# Patient Record
Sex: MALE | Race: WHITE | Employment: FULL TIME | URBAN - METROPOLITAN AREA
[De-identification: names, ages, dates, MRNs, and addresses within clinical notes are randomized per-mention and may not be internally consistent; named-entity substitution may affect disease eponyms.]

---

## 2018-05-05 ENCOUNTER — HOSPITAL ENCOUNTER (EMERGENCY)
Age: 68
Discharge: HOME | End: 2018-05-05
Attending: EMERGENCY MEDICINE
Payer: COMMERCIAL

## 2018-05-05 VITALS
TEMPERATURE: 98.3 F | BODY MASS INDEX: 28.5 KG/M2 | RESPIRATION RATE: 18 BRPM | SYSTOLIC BLOOD PRESSURE: 166 MMHG | HEIGHT: 68 IN | DIASTOLIC BLOOD PRESSURE: 71 MMHG | WEIGHT: 188.05 LBS | OXYGEN SATURATION: 100 % | HEART RATE: 72 BPM

## 2018-05-05 DIAGNOSIS — R25.2 MUSCLE CRAMPS: Primary | ICD-10-CM

## 2018-05-05 PROCEDURE — 99282 EMERGENCY DEPT VISIT SF MDM: CPT

## 2018-05-05 RX ORDER — CYCLOBENZAPRINE HCL 10 MG
10 TABLET ORAL
Qty: 20 TAB | Refills: 0 | Status: SHIPPED | OUTPATIENT
Start: 2018-05-05

## 2018-05-05 RX ORDER — IBUPROFEN 600 MG/1
600 TABLET ORAL
Qty: 20 TAB | Refills: 0 | Status: SHIPPED | OUTPATIENT
Start: 2018-05-05

## 2018-05-05 RX ORDER — LISINOPRIL AND HYDROCHLOROTHIAZIDE 12.5; 2 MG/1; MG/1
TABLET ORAL DAILY
COMMUNITY

## 2018-05-05 RX ORDER — GUAIFENESIN 100 MG/5ML
81 LIQUID (ML) ORAL DAILY
COMMUNITY

## 2018-05-05 RX ORDER — ATORVASTATIN CALCIUM 20 MG/1
TABLET, FILM COATED ORAL DAILY
COMMUNITY

## 2018-05-05 NOTE — ED PROVIDER NOTES
EMERGENCY DEPARTMENT HISTORY AND PHYSICAL EXAM      Date: 5/5/2018  Patient Name: Venkatesh Tirado    History of Presenting Illness     Chief Complaint   Patient presents with    Leg Pain     \"i got a sharp shooting cramping pain in my right calf while walking at work that shot up into my thigh after\"       History Provided By: Patient    HPI: Venkatesh Tirado, 78 y.o. male with no significant PMHx, presents ambulatory to the ED with cc of constant posterior left leg pain which started today while walking at work. He states his pain is in his calf and quadriceps. Pt describes the pain as a cramp. His associated pain is moderate. He notes his pain is worse with walking. He has tried resting in his recliner at home with minimal relief. Pt reports no redness or swelling of his leg. He is otherwise without complaint. Chief Complaint: left leg pain  Duration: 1 day  Timing:  Constant  Location: posterior left leg  Quality: Cramping  Severity: Moderate  Modifying Factors: worse with walking  Associated Symptoms: denies any other associated signs or symptoms    There are no other complaints, changes, or physical findings at this time. PCP: PROVIDER UNKNOWN    Past History     Past Medical History:  Past Medical History:   Diagnosis Date    Hypertension        Past Surgical History:  History reviewed. No pertinent surgical history. Family History:  History reviewed. No pertinent family history. Social History:  Social History   Substance Use Topics    Smoking status: Never Smoker    Smokeless tobacco: Never Used    Alcohol use No       Allergies:  No Known Allergies      Review of Systems   Review of Systems   Constitutional: Negative for chills and fever. HENT: Negative for ear pain and sore throat. Eyes: Negative for pain, redness and visual disturbance. Respiratory: Negative for chest tightness, shortness of breath and wheezing. Cardiovascular: Negative for chest pain and palpitations. Gastrointestinal: Negative for abdominal pain, diarrhea, nausea, rectal pain and vomiting. Genitourinary: Negative for dysuria, frequency and urgency. Musculoskeletal: Positive for myalgias (left leg cramping). Negative for arthralgias, back pain and neck pain. Skin: Negative for rash and wound. Neurological: Negative for dizziness, weakness, numbness and headaches. Hematological: Negative for adenopathy. Psychiatric/Behavioral: Negative for dysphoric mood. The patient is not nervous/anxious. Physical Exam   Physical Exam   Constitutional: He is oriented to person, place, and time. He appears well-developed and well-nourished. Non-toxic appearance. No distress. HENT:   Head: Normocephalic and atraumatic. Head is without right periorbital erythema and without left periorbital erythema. Right Ear: External ear normal.   Left Ear: External ear normal.   Nose: Nose normal.   Mouth/Throat: Uvula is midline. No trismus in the jaw. Eyes: Conjunctivae and EOM are normal. Pupils are equal, round, and reactive to light. No scleral icterus. Neck: Normal range of motion and full passive range of motion without pain. Cardiovascular: Normal rate, regular rhythm and normal heart sounds. Pulmonary/Chest: Effort normal and breath sounds normal. No accessory muscle usage. No tachypnea. No respiratory distress. He has no decreased breath sounds. He has no wheezes. Abdominal: Soft. There is no tenderness. There is no rigidity and no guarding. Musculoskeletal: Normal range of motion. Good symmetry of lower extremities. No redness or swelling. No palpable muscle spasm of either lower extremity. No palpable varicosities. Full active ROM. Neurological: He is alert and oriented to person, place, and time. He is not disoriented. No cranial nerve deficit or sensory deficit. GCS eye subscore is 4. GCS verbal subscore is 5. GCS motor subscore is 6. Skin: Skin is intact. No rash noted. Psychiatric: He has a normal mood and affect. His speech is normal.   Nursing note and vitals reviewed. Medical Decision Making   I am the first provider for this patient. I reviewed the vital signs, available nursing notes, past medical history, past surgical history, family history and social history. Vital Signs-Reviewed the patient's vital signs. Patient Vitals for the past 12 hrs:   Temp Pulse Resp BP SpO2   05/05/18 1934 98.3 °F (36.8 °C) 72 18 166/71 100 %     Records Reviewed: Nursing Notes and Old Medical Records    Provider Notes (Medical Decision Making):   Presentation suggests muscle cramps. Basic testing offered. Pt declines. I believe this is safe. Will encourage hydration, rest, and medications with follow up if symptoms worsen. ED Course:   Initial assessment performed. The patients presenting problems have been discussed, and they are in agreement with the care plan formulated and outlined with them. I have encouraged them to ask questions as they arise throughout their visit. 8:27 PM  Patient notes he has recently had extensive blood work with no abnormal findings. He states he does not drink much water. Disposition:  DISCHARGE NOTE  8:48 PM  The patient has been re-evaluated and is ready for discharge. Reviewed available results with patient. Counseled patient on diagnosis and care plan. Patient has expressed understanding, and all questions have been answered. Patient agrees with plan and agrees to follow up as recommended, or return to the ED if their symptoms worsen. Discharge instructions have been provided and explained to the patient, along with reasons to return to the ED. PLAN:  1.    Discharge Medication List as of 5/5/2018  8:50 PM      START taking these medications    Details   ibuprofen (MOTRIN) 600 mg tablet Take 1 Tab by mouth every eight (8) hours as needed for Pain., Print, Disp-20 Tab, R-0      cyclobenzaprine (FLEXERIL) 10 mg tablet Take 1 Tab by mouth three (3) times daily as needed for Muscle Spasm(s). , Print, Disp-20 Tab, R-0         CONTINUE these medications which have NOT CHANGED    Details   aspirin 81 mg chewable tablet Take 81 mg by mouth daily. , Historical Med      atorvastatin (LIPITOR) 20 mg tablet Take  by mouth daily. , Historical Med      lisinopril-hydroCHLOROthiazide (PRINZIDE, ZESTORETIC) 20-12.5 mg per tablet Take  by mouth daily. , Historical Med           2. Follow-up Information     Follow up With Details Comments Contact Info    [facility] Schedule an appointment as soon as possible for a visit PRIMARY CARE: call to schedule follow up [address]  [address] [facility]  [phone]        Return to ED if worse     Diagnosis     Clinical Impression:   1. Muscle cramps        Attestations:    Attestation Note:  This note is prepared by RICKY [facility], acting as Scribe for [employer] PA-C Griffin Olszewski: The scribe's documentation has been prepared under my direction and personally reviewed by me in its entirety. I confirm that the note above accurately reflects all work, treatment, procedures, and medical decision making performed by me.

## 2018-05-06 NOTE — ED NOTES
MICHELLE Juárez has reviewed discharge instructions with the patient. The patient verbalized understanding. Pt ambulatory home, discharge papers in hand.

## 2018-05-06 NOTE — ED TRIAGE NOTES
Pt arrived ambulatory from triage to room 6 with cc right leg pain. Per pt he was walking at work when he felt a cramp in his right calf, pt states the pain continued to get worse with ambulation and was shooting up to his thigh. Pt denies any long trips recently. Leg is not hot to tough, no redness or swelling noted. Pt in no acute distress. VSS.

## 2024-04-29 ENCOUNTER — HOSPITAL ENCOUNTER (OUTPATIENT)
Facility: HOSPITAL | Age: 74
Discharge: HOME | End: 2024-05-01
Payer: MEDICARE

## 2024-04-29 DIAGNOSIS — Z86.79 HISTORY OF CAROTID STENOSIS: ICD-10-CM

## 2024-04-29 PROCEDURE — 93880 EXTRACRANIAL BILAT STUDY: CPT

## 2024-05-01 LAB
VAS LEFT CCA DIST EDV: 25.7 CM/S
VAS LEFT CCA DIST PSV: 103.2 CM/S
VAS LEFT CCA PROX EDV: 20.8 CM/S
VAS LEFT CCA PROX PSV: 98.4 CM/S
VAS LEFT ECA EDV: 9.06 CM/S
VAS LEFT ECA PSV: 136.9 CM/S
VAS LEFT ICA DIST EDV: 33.8 CM/S
VAS LEFT ICA DIST PSV: 90.3 CM/S
VAS LEFT ICA MID EDV: 28.9 CM/S
VAS LEFT ICA MID PSV: 91.9 CM/S
VAS LEFT ICA PROX EDV: 14.4 CM/S
VAS LEFT ICA PROX PSV: 74.1 CM/S
VAS LEFT ICA/CCA PSV: 0.89 NO UNITS
VAS LEFT SUBCLAVIAN PROX EDV: 0 CM/S
VAS LEFT SUBCLAVIAN PROX PSV: 139.2 CM/S
VAS LEFT VERTEBRAL EDV: 5.71 CM/S
VAS LEFT VERTEBRAL PSV: 35.4 CM/S
VAS RIGHT CCA DIST EDV: 23.5 CM/S
VAS RIGHT CCA DIST PSV: 104.3 CM/S
VAS RIGHT CCA PROX EDV: 21.5 CM/S
VAS RIGHT CCA PROX PSV: 106.2 CM/S
VAS RIGHT ECA EDV: 8.36 CM/S
VAS RIGHT ECA PSV: 159.3 CM/S
VAS RIGHT ICA DIST EDV: 34.6 CM/S
VAS RIGHT ICA DIST PSV: 129.8 CM/S
VAS RIGHT ICA MID EDV: 14.9 CM/S
VAS RIGHT ICA MID PSV: 71.4 CM/S
VAS RIGHT ICA PROX EDV: 16.2 CM/S
VAS RIGHT ICA PROX PSV: 77.3 CM/S
VAS RIGHT ICA/CCA PSV: 1.3 NO UNITS
VAS RIGHT SUBCLAVIAN PROX EDV: 0 CM/S
VAS RIGHT SUBCLAVIAN PROX PSV: 116.1 CM/S
VAS RIGHT VERTEBRAL EDV: 14.11 CM/S
VAS RIGHT VERTEBRAL PSV: 58.1 CM/S

## 2025-02-13 ENCOUNTER — APPOINTMENT (OUTPATIENT)
Facility: HOSPITAL | Age: 75
DRG: 176 | End: 2025-02-13
Payer: MEDICARE

## 2025-02-13 ENCOUNTER — HOSPITAL ENCOUNTER (INPATIENT)
Facility: HOSPITAL | Age: 75
LOS: 3 days | Discharge: HOME | DRG: 176 | End: 2025-02-16
Attending: EMERGENCY MEDICINE | Admitting: INTERNAL MEDICINE
Payer: MEDICARE

## 2025-02-13 ENCOUNTER — APPOINTMENT (OUTPATIENT)
Facility: HOSPITAL | Age: 75
DRG: 176 | End: 2025-02-13
Attending: EMERGENCY MEDICINE
Payer: MEDICARE

## 2025-02-13 DIAGNOSIS — K21.9 GASTROESOPHAGEAL REFLUX DISEASE, UNSPECIFIED WHETHER ESOPHAGITIS PRESENT: ICD-10-CM

## 2025-02-13 DIAGNOSIS — I26.93 SINGLE SUBSEGMENTAL PULMONARY EMBOLISM WITHOUT ACUTE COR PULMONALE (HCC): ICD-10-CM

## 2025-02-13 DIAGNOSIS — R73.9 HYPERGLYCEMIA: ICD-10-CM

## 2025-02-13 DIAGNOSIS — E87.1 HYPONATREMIA: ICD-10-CM

## 2025-02-13 DIAGNOSIS — R21 RASH AND OTHER NONSPECIFIC SKIN ERUPTION: Primary | ICD-10-CM

## 2025-02-13 DIAGNOSIS — I26.99 ACUTE PULMONARY EMBOLISM WITHOUT ACUTE COR PULMONALE, UNSPECIFIED PULMONARY EMBOLISM TYPE (HCC): ICD-10-CM

## 2025-02-13 LAB
ALBUMIN SERPL-MCNC: 3.1 G/DL (ref 3.5–5)
ALBUMIN/GLOB SERPL: 0.9 (ref 1.1–2.2)
ALP SERPL-CCNC: 69 U/L (ref 45–117)
ALT SERPL-CCNC: 16 U/L (ref 12–78)
ANION GAP SERPL CALC-SCNC: 5 MMOL/L (ref 2–12)
AST SERPL-CCNC: 12 U/L (ref 15–37)
BASOPHILS # BLD: 0.01 K/UL (ref 0–0.1)
BASOPHILS NFR BLD: 0.1 % (ref 0–1)
BILIRUB SERPL-MCNC: 1 MG/DL (ref 0.2–1)
BUN SERPL-MCNC: 26 MG/DL (ref 6–20)
BUN/CREAT SERPL: 19 (ref 12–20)
CALCIUM SERPL-MCNC: 8.4 MG/DL (ref 8.5–10.1)
CHLORIDE SERPL-SCNC: 99 MMOL/L (ref 97–108)
CK SERPL-CCNC: 66 U/L (ref 39–308)
CO2 SERPL-SCNC: 28 MMOL/L (ref 21–32)
CREAT SERPL-MCNC: 1.36 MG/DL (ref 0.7–1.3)
CRP SERPL-MCNC: 8.8 MG/DL (ref 0–0.3)
DIFFERENTIAL METHOD BLD: ABNORMAL
ECHO BSA: 2.12 M2
ECHO EST RA PRESSURE: 10 MMHG
ECHO LA DIAMETER INDEX: 1.15 CM/M2
ECHO LA DIAMETER: 2.4 CM
ECHO LV EF PHYSICIAN: 55 %
ECHO LV FRACTIONAL SHORTENING: 47 % (ref 28–44)
ECHO LV INTERNAL DIMENSION DIASTOLE INDEX: 2.25 CM/M2
ECHO LV INTERNAL DIMENSION DIASTOLIC: 4.7 CM (ref 4.2–5.9)
ECHO LV INTERNAL DIMENSION SYSTOLIC INDEX: 1.2 CM/M2
ECHO LV INTERNAL DIMENSION SYSTOLIC: 2.5 CM
ECHO LV IVSD: 0.8 CM (ref 0.6–1)
ECHO LV MASS 2D: 153.4 G (ref 88–224)
ECHO LV MASS INDEX 2D: 73.4 G/M2 (ref 49–115)
ECHO LV POSTERIOR WALL DIASTOLIC: 1.1 CM (ref 0.6–1)
ECHO LV RELATIVE WALL THICKNESS RATIO: 0.47
ECHO RIGHT VENTRICULAR SYSTOLIC PRESSURE (RVSP): 30 MMHG
ECHO TV REGURGITANT MAX VELOCITY: 2.22 M/S
ECHO TV REGURGITANT PEAK GRADIENT: 20 MMHG
EOSINOPHIL # BLD: 0.01 K/UL (ref 0–0.4)
EOSINOPHIL NFR BLD: 0.1 % (ref 0–7)
ERYTHROCYTE [DISTWIDTH] IN BLOOD BY AUTOMATED COUNT: 12 % (ref 11.5–14.5)
ERYTHROCYTE [SEDIMENTATION RATE] IN BLOOD: 4 MM/HR (ref 0–20)
GLOBULIN SER CALC-MCNC: 3.3 G/DL (ref 2–4)
GLUCOSE SERPL-MCNC: 230 MG/DL (ref 65–100)
HCT VFR BLD AUTO: 46 % (ref 36.6–50.3)
HGB BLD-MCNC: 15.8 G/DL (ref 12.1–17)
IMM GRANULOCYTES # BLD AUTO: 0.03 K/UL (ref 0–0.04)
IMM GRANULOCYTES NFR BLD AUTO: 0.4 % (ref 0–0.5)
INR PPP: 1.1 (ref 0.9–1.1)
LACTATE BLD-SCNC: 1.9 MMOL/L (ref 0.4–2)
LYMPHOCYTES # BLD: 0.72 K/UL (ref 0.8–3.5)
LYMPHOCYTES NFR BLD: 10.1 % (ref 12–49)
MCH RBC QN AUTO: 32 PG (ref 26–34)
MCHC RBC AUTO-ENTMCNC: 34.3 G/DL (ref 30–36.5)
MCV RBC AUTO: 93.1 FL (ref 80–99)
MONOCYTES # BLD: 0.34 K/UL (ref 0–1)
MONOCYTES NFR BLD: 4.8 % (ref 5–13)
NEUTS SEG # BLD: 5.99 K/UL (ref 1.8–8)
NEUTS SEG NFR BLD: 84.5 % (ref 32–75)
NRBC # BLD: 0 K/UL (ref 0–0.01)
NRBC BLD-RTO: 0 PER 100 WBC
NT PRO BNP: 66 PG/ML
PLATELET # BLD AUTO: 180 K/UL (ref 150–400)
PMV BLD AUTO: 9 FL (ref 8.9–12.9)
POTASSIUM SERPL-SCNC: 4.6 MMOL/L (ref 3.5–5.1)
PROT SERPL-MCNC: 6.4 G/DL (ref 6.4–8.2)
PROTHROMBIN TIME: 11.9 SEC (ref 9.2–11.2)
RBC # BLD AUTO: 4.94 M/UL (ref 4.1–5.7)
RBC MORPH BLD: ABNORMAL
SODIUM SERPL-SCNC: 132 MMOL/L (ref 136–145)
TROPONIN I SERPL HS-MCNC: 11 NG/L (ref 0–76)
TROPONIN I SERPL HS-MCNC: 13 NG/L (ref 0–76)
WBC # BLD AUTO: 7.1 K/UL (ref 4.1–11.1)
WBC MORPH BLD: ABNORMAL

## 2025-02-13 PROCEDURE — 71275 CT ANGIOGRAPHY CHEST: CPT

## 2025-02-13 PROCEDURE — 6360000004 HC RX CONTRAST MEDICATION: Performed by: EMERGENCY MEDICINE

## 2025-02-13 PROCEDURE — 85652 RBC SED RATE AUTOMATED: CPT

## 2025-02-13 PROCEDURE — 86140 C-REACTIVE PROTEIN: CPT

## 2025-02-13 PROCEDURE — 2580000003 HC RX 258: Performed by: INTERNAL MEDICINE

## 2025-02-13 PROCEDURE — 36415 COLL VENOUS BLD VENIPUNCTURE: CPT

## 2025-02-13 PROCEDURE — 93005 ELECTROCARDIOGRAM TRACING: CPT | Performed by: INTERNAL MEDICINE

## 2025-02-13 PROCEDURE — 85610 PROTHROMBIN TIME: CPT

## 2025-02-13 PROCEDURE — 86037 ANCA TITER EACH ANTIBODY: CPT

## 2025-02-13 PROCEDURE — 86225 DNA ANTIBODY NATIVE: CPT

## 2025-02-13 PROCEDURE — 6370000000 HC RX 637 (ALT 250 FOR IP): Performed by: EMERGENCY MEDICINE

## 2025-02-13 PROCEDURE — 99285 EMERGENCY DEPT VISIT HI MDM: CPT

## 2025-02-13 PROCEDURE — 93005 ELECTROCARDIOGRAM TRACING: CPT | Performed by: EMERGENCY MEDICINE

## 2025-02-13 PROCEDURE — 86160 COMPLEMENT ANTIGEN: CPT

## 2025-02-13 PROCEDURE — 83605 ASSAY OF LACTIC ACID: CPT

## 2025-02-13 PROCEDURE — 86162 COMPLEMENT TOTAL (CH50): CPT

## 2025-02-13 PROCEDURE — 6360000002 HC RX W HCPCS: Performed by: INTERNAL MEDICINE

## 2025-02-13 PROCEDURE — 82550 ASSAY OF CK (CPK): CPT

## 2025-02-13 PROCEDURE — 71045 X-RAY EXAM CHEST 1 VIEW: CPT

## 2025-02-13 PROCEDURE — 6360000002 HC RX W HCPCS: Performed by: EMERGENCY MEDICINE

## 2025-02-13 PROCEDURE — 84484 ASSAY OF TROPONIN QUANT: CPT

## 2025-02-13 PROCEDURE — 86235 NUCLEAR ANTIGEN ANTIBODY: CPT

## 2025-02-13 PROCEDURE — 6370000000 HC RX 637 (ALT 250 FOR IP): Performed by: INTERNAL MEDICINE

## 2025-02-13 PROCEDURE — 83516 IMMUNOASSAY NONANTIBODY: CPT

## 2025-02-13 PROCEDURE — 85025 COMPLETE CBC W/AUTO DIFF WBC: CPT

## 2025-02-13 PROCEDURE — 6370000000 HC RX 637 (ALT 250 FOR IP): Performed by: NURSE PRACTITIONER

## 2025-02-13 PROCEDURE — 1100000003 HC PRIVATE W/ TELEMETRY

## 2025-02-13 PROCEDURE — 93308 TTE F-UP OR LMTD: CPT

## 2025-02-13 PROCEDURE — 2500000003 HC RX 250 WO HCPCS: Performed by: INTERNAL MEDICINE

## 2025-02-13 PROCEDURE — 80053 COMPREHEN METABOLIC PANEL: CPT

## 2025-02-13 PROCEDURE — 83880 ASSAY OF NATRIURETIC PEPTIDE: CPT

## 2025-02-13 RX ORDER — ENOXAPARIN SODIUM 100 MG/ML
1 INJECTION SUBCUTANEOUS EVERY 12 HOURS
Status: DISCONTINUED | OUTPATIENT
Start: 2025-02-13 | End: 2025-02-16 | Stop reason: HOSPADM

## 2025-02-13 RX ORDER — ENOXAPARIN SODIUM 100 MG/ML
1 INJECTION SUBCUTANEOUS ONCE
Status: COMPLETED | OUTPATIENT
Start: 2025-02-13 | End: 2025-02-13

## 2025-02-13 RX ORDER — ACETAMINOPHEN 325 MG/1
650 TABLET ORAL EVERY 6 HOURS PRN
Status: DISCONTINUED | OUTPATIENT
Start: 2025-02-13 | End: 2025-02-16 | Stop reason: HOSPADM

## 2025-02-13 RX ORDER — ONDANSETRON 2 MG/ML
4 INJECTION INTRAMUSCULAR; INTRAVENOUS EVERY 6 HOURS PRN
Status: DISCONTINUED | OUTPATIENT
Start: 2025-02-13 | End: 2025-02-16 | Stop reason: HOSPADM

## 2025-02-13 RX ORDER — LISINOPRIL 2.5 MG/1
TABLET ORAL DAILY
COMMUNITY

## 2025-02-13 RX ORDER — SODIUM CHLORIDE 0.9 % (FLUSH) 0.9 %
5-40 SYRINGE (ML) INJECTION PRN
Status: DISCONTINUED | OUTPATIENT
Start: 2025-02-13 | End: 2025-02-16 | Stop reason: HOSPADM

## 2025-02-13 RX ORDER — POTASSIUM CHLORIDE 1500 MG/1
40 TABLET, EXTENDED RELEASE ORAL PRN
Status: DISCONTINUED | OUTPATIENT
Start: 2025-02-13 | End: 2025-02-16 | Stop reason: HOSPADM

## 2025-02-13 RX ORDER — SODIUM CHLORIDE 9 MG/ML
INJECTION, SOLUTION INTRAVENOUS PRN
Status: DISCONTINUED | OUTPATIENT
Start: 2025-02-13 | End: 2025-02-16 | Stop reason: HOSPADM

## 2025-02-13 RX ORDER — ASPIRIN 81 MG/1
81 TABLET, CHEWABLE ORAL DAILY
COMMUNITY

## 2025-02-13 RX ORDER — ACETAMINOPHEN 650 MG/1
650 SUPPOSITORY RECTAL EVERY 6 HOURS PRN
Status: DISCONTINUED | OUTPATIENT
Start: 2025-02-13 | End: 2025-02-16 | Stop reason: HOSPADM

## 2025-02-13 RX ORDER — MAGNESIUM SULFATE IN WATER 40 MG/ML
2000 INJECTION, SOLUTION INTRAVENOUS PRN
Status: DISCONTINUED | OUTPATIENT
Start: 2025-02-13 | End: 2025-02-16 | Stop reason: HOSPADM

## 2025-02-13 RX ORDER — DIPHENHYDRAMINE HYDROCHLORIDE 50 MG/ML
25 INJECTION INTRAMUSCULAR; INTRAVENOUS EVERY 6 HOURS PRN
Status: DISCONTINUED | OUTPATIENT
Start: 2025-02-13 | End: 2025-02-16 | Stop reason: HOSPADM

## 2025-02-13 RX ORDER — FAMOTIDINE 20 MG/1
20 TABLET, FILM COATED ORAL 2 TIMES DAILY PRN
Status: DISCONTINUED | OUTPATIENT
Start: 2025-02-13 | End: 2025-02-16 | Stop reason: HOSPADM

## 2025-02-13 RX ORDER — ATORVASTATIN CALCIUM 10 MG/1
10 TABLET, FILM COATED ORAL NIGHTLY
Status: DISCONTINUED | OUTPATIENT
Start: 2025-02-13 | End: 2025-02-16 | Stop reason: HOSPADM

## 2025-02-13 RX ORDER — POLYETHYLENE GLYCOL 3350 17 G/17G
17 POWDER, FOR SOLUTION ORAL DAILY PRN
Status: DISCONTINUED | OUTPATIENT
Start: 2025-02-13 | End: 2025-02-16 | Stop reason: HOSPADM

## 2025-02-13 RX ORDER — SODIUM CHLORIDE 9 MG/ML
INJECTION, SOLUTION INTRAVENOUS CONTINUOUS
Status: DISCONTINUED | OUTPATIENT
Start: 2025-02-13 | End: 2025-02-16 | Stop reason: HOSPADM

## 2025-02-13 RX ORDER — IOPAMIDOL 755 MG/ML
100 INJECTION, SOLUTION INTRAVASCULAR
Status: COMPLETED | OUTPATIENT
Start: 2025-02-13 | End: 2025-02-13

## 2025-02-13 RX ORDER — ONDANSETRON 4 MG/1
4 TABLET, ORALLY DISINTEGRATING ORAL EVERY 8 HOURS PRN
Status: DISCONTINUED | OUTPATIENT
Start: 2025-02-13 | End: 2025-02-16 | Stop reason: HOSPADM

## 2025-02-13 RX ORDER — ACETAMINOPHEN 325 MG/1
650 TABLET ORAL EVERY 4 HOURS PRN
Status: DISCONTINUED | OUTPATIENT
Start: 2025-02-13 | End: 2025-02-16 | Stop reason: HOSPADM

## 2025-02-13 RX ORDER — SIMVASTATIN 5 MG
TABLET ORAL NIGHTLY
COMMUNITY

## 2025-02-13 RX ORDER — POTASSIUM CHLORIDE 7.45 MG/ML
10 INJECTION INTRAVENOUS PRN
Status: DISCONTINUED | OUTPATIENT
Start: 2025-02-13 | End: 2025-02-16 | Stop reason: HOSPADM

## 2025-02-13 RX ORDER — ASPIRIN 81 MG/1
81 TABLET, CHEWABLE ORAL DAILY
Status: DISCONTINUED | OUTPATIENT
Start: 2025-02-13 | End: 2025-02-16 | Stop reason: HOSPADM

## 2025-02-13 RX ORDER — DIPHENHYDRAMINE HCL 25 MG
25 TABLET ORAL EVERY 6 HOURS PRN
Qty: 20 TABLET | Refills: 0 | Status: SHIPPED | OUTPATIENT
Start: 2025-02-13 | End: 2025-02-23

## 2025-02-13 RX ORDER — SODIUM CHLORIDE 0.9 % (FLUSH) 0.9 %
5-40 SYRINGE (ML) INJECTION EVERY 12 HOURS SCHEDULED
Status: DISCONTINUED | OUTPATIENT
Start: 2025-02-13 | End: 2025-02-16 | Stop reason: HOSPADM

## 2025-02-13 RX ADMIN — SODIUM CHLORIDE, PRESERVATIVE FREE 10 ML: 5 INJECTION INTRAVENOUS at 21:33

## 2025-02-13 RX ADMIN — FAMOTIDINE 20 MG: 20 TABLET, FILM COATED ORAL at 21:12

## 2025-02-13 RX ADMIN — ENOXAPARIN SODIUM 90 MG: 100 INJECTION SUBCUTANEOUS at 14:29

## 2025-02-13 RX ADMIN — LIDOCAINE HYDROCHLORIDE 40 ML: 20 SOLUTION ORAL at 10:50

## 2025-02-13 RX ADMIN — SODIUM CHLORIDE: 9 INJECTION, SOLUTION INTRAVENOUS at 16:19

## 2025-02-13 RX ADMIN — ATORVASTATIN CALCIUM 10 MG: 10 TABLET, FILM COATED ORAL at 21:05

## 2025-02-13 RX ADMIN — DIPHENHYDRAMINE HYDROCHLORIDE 25 MG: 50 INJECTION INTRAMUSCULAR; INTRAVENOUS at 21:05

## 2025-02-13 RX ADMIN — ASPIRIN 81 MG: 81 TABLET, CHEWABLE ORAL at 16:17

## 2025-02-13 RX ADMIN — IOPAMIDOL 60 ML: 755 INJECTION, SOLUTION INTRAVENOUS at 13:12

## 2025-02-13 RX ADMIN — ENOXAPARIN SODIUM 90 MG: 100 INJECTION SUBCUTANEOUS at 21:01

## 2025-02-13 ASSESSMENT — LIFESTYLE VARIABLES
HOW OFTEN DO YOU HAVE A DRINK CONTAINING ALCOHOL: NEVER
HOW MANY STANDARD DRINKS CONTAINING ALCOHOL DO YOU HAVE ON A TYPICAL DAY: PATIENT DOES NOT DRINK

## 2025-02-13 ASSESSMENT — PAIN SCALES - GENERAL
PAINLEVEL_OUTOF10: 0
PAINLEVEL_OUTOF10: 0

## 2025-02-13 ASSESSMENT — HEART SCORE: ECG: NON-SPECIFC REPOLARIZATION DISTURBANCE/LBTB/PM

## 2025-02-13 NOTE — H&P
Hospitalist Admission Note    NAME:   Buck Perdomo   : 1950   MRN: [id number]     Date/Time: 2025 2:47 PM    Patient PCP: Moreno Brennan MD    ______________________________________________________________________  Given the patient's current clinical presentation, I have a high level of concern for decompensation if discharged from the emergency department.  Complex decision making was performed, which includes reviewing the patient's available past medical records, laboratory results, and x-ray films.       My assessment of this patient's clinical condition and my plan of care is as follows.    Assessment / Plan:      Pulmonary embolism  Syncope due to above    - CT chest showed left lingular pulmonary embolism  -Currently on room air, no right heart strain on CT  -Admit to medical floor  -Received Lovenox, continue  -Echo pending  Heme-onc eval    Urticaria, persistent for past 3 to 5 days  Rule out vasculitis  Consult general surgery for skin biopsy  Check complements, ANCA, ESR,CRP,MELANI  Check UA      H/O TIA  Carotid artery stenosis, s/p ?CEA  -Continue aspirin, statin          Medical Decision Making:   I personally reviewed labs: CBC/BMP  I personally reviewed imaging: CTA chest  I personally reviewed EKG: NSR  Toxic drug monitoring:   Discussed case with: ED provider. After discussion I am in agreement that acuity of patient's medical condition necessitates hospital stay.      Code Status: Full code  DVT Prophylaxis:   Baseline:     Subjective:   CHIEF COMPLAINT: Chest pain    HISTORY OF PRESENT ILLNESS:     Buck Perdomo is a 74 y.o.  male with PMHx significant for TIA, carotid artery stenosis, with possible carotid artery endarterectomy, who presented to ED with a chief complaint of indigestion, rash which has been going on for past 6 days, he reports that he has been having indigestion, chest pain for few days, but he started noticing rash in axillary area and around thigh initially

## 2025-02-13 NOTE — DISCHARGE INSTRUCTIONS
Results for orders placed or performed during the hospital encounter of 02/13/25   CBC with Auto Differential   Result Value Ref Range    WBC 7.1 4.1 - 11.1 K/uL    RBC 4.94 4.10 - 5.70 M/uL    Hemoglobin 15.8 12.1 - 17.0 g/dL    Hematocrit 46.0 36.6 - 50.3 %    MCV 93.1 80.0 - 99.0 FL    MCH 32.0 26.0 - 34.0 PG    MCHC 34.3 30.0 - 36.5 g/dL    RDW 12.0 11.5 - 14.5 %    Platelets 180 150 - 400 K/uL    MPV 9.0 8.9 - 12.9 FL    Nucleated RBCs 0.0 0  WBC    nRBC 0.00 0.00 - 0.01 K/uL    Neutrophils % 84.5 (H) 32.0 - 75.0 %    Lymphocytes % 10.1 (L) 12.0 - 49.0 %    Monocytes % 4.8 (L) 5.0 - 13.0 %    Eosinophils % 0.1 0.0 - 7.0 %    Basophils % 0.1 0.0 - 1.0 %    Immature Granulocytes % 0.4 0.0 - 0.5 %    Neutrophils Absolute 5.99 1.80 - 8.00 K/UL    Lymphocytes Absolute 0.72 (L) 0.80 - 3.50 K/UL    Monocytes Absolute 0.34 0.00 - 1.00 K/UL    Eosinophils Absolute 0.01 0.00 - 0.40 K/UL    Basophils Absolute 0.01 0.00 - 0.10 K/UL    Immature Granulocytes Absolute 0.03 0.00 - 0.04 K/UL    Differential Type SMEAR SCANNED      RBC Comment NORMOCYTIC, NORMOCHROMIC      WBC Comment REACTIVE LYMPHS     Comprehensive Metabolic Panel   Result Value Ref Range    Sodium 132 (L) 136 - 145 mmol/L    Potassium 4.6 3.5 - 5.1 mmol/L    Chloride 99 97 - 108 mmol/L    CO2 28 21 - 32 mmol/L    Anion Gap 5 2 - 12 mmol/L    Glucose 230 (H) 65 - 100 mg/dL    BUN 26 (H) 6 - 20 MG/DL    Creatinine 1.36 (H) 0.70 - 1.30 MG/DL    BUN/Creatinine Ratio 19 12 - 20      Est, Glom Filt Rate 55 (L) >60 ml/min/1.73m2    Calcium 8.4 (L) 8.5 - 10.1 MG/DL    Total Bilirubin 1.0 0.2 - 1.0 MG/DL    ALT 16 12 - 78 U/L    AST 12 (L) 15 - 37 U/L    Alk Phosphatase 69 45 - 117 U/L    Total Protein 6.4 6.4 - 8.2 g/dL    Albumin 3.1 (L) 3.5 - 5.0 g/dL    Globulin 3.3 2.0 - 4.0 g/dL    Albumin/Globulin Ratio 0.9 (L) 1.1 - 2.2     Troponin   Result Value Ref Range    Troponin, High Sensitivity 13 0 - 76 ng/L   EKG 12 Lead   Result Value Ref Range

## 2025-02-13 NOTE — ED PROVIDER NOTES
[facility] EMERGENCY DEPARTMENT  EMERGENCY DEPARTMENT ENCOUNTER    Patient Name: Buck Perdomo  MRN: [id number]  YOB: 1950  Provider: Yemi Cortés MD  PCP: Moreno Brennan MD  Time/Date of evaluation: 9:32 AM EST on 2/13/25    History of Presenting Illness     Chief Complaint   Patient presents with    Referral - General     Sent by PCP office for indigestion, rash and swelling to bilateral upper arm and hands x several days     History Provided by: Patient   History is limited by: Nothing    HISTORY (Narrative):   Buck Perdomo is a 74 y.o. male with a PMHX of hypertension and hyperlipidemia  who presents to the emergency department (room 17) by POV C/O multiple complaints.  Patient complains of indigestion that started yesterday as well as facial swelling and a diffuse rash for the past 5 days.  Patient walked into his PCPs office today and was referred to the emergency department for further evaluation.  He denies any new exposures or sick contacts.  He tells me he has never had indigestion in the past.  He denies any shortness of breath, diaphoresis, nausea, vomiting, or diarrhea.    Nursing Notes were all reviewed and agreed with or any disagreements were addressed in the HPI.    Past History     PAST MEDICAL HISTORY:  History reviewed. No pertinent past medical history.    PAST SURGICAL HISTORY:  History reviewed. No pertinent surgical history.    FAMILY HISTORY:  History reviewed. No pertinent family history.    SOCIAL HISTORY:       MEDICATIONS:  No current facility-administered medications on file prior to encounter.     Current Outpatient Medications on File Prior to Encounter   Medication Sig Dispense Refill    lisinopril (PRINIVIL;ZESTRIL) 2.5 MG tablet Take by mouth daily      simvastatin (ZOCOR) 5 MG tablet Take by mouth nightly      aspirin 81 MG chewable tablet Take 1 tablet by mouth daily         ALLERGIES:  No Known Allergies    SOCIAL DETERMINANTS OF HEALTH:  Social

## 2025-02-13 NOTE — ED NOTES
Patient rang out.  Upon entering room found patient with gaze deviating to the R, non responding, patient diaphoretic, raised rash scattered throughout, hypotensive and bradycardia.  Episode lasted about 1 minute and patient immediately became responsive again and alert and oriented x4.  Patient states that he felt like he had indigestion prior to the event but was feeling better.  Dr. Cortés at bedside to evaluate patient.  Patient placed on zoll monitor at this time.

## 2025-02-13 NOTE — ED NOTES
Patient ambulated to bathroom with staff.  Staff did not collect urine sample.  Patient aware of need for urine.  Will obtain next time patient uses the bathroom.

## 2025-02-14 ENCOUNTER — APPOINTMENT (OUTPATIENT)
Facility: HOSPITAL | Age: 75
DRG: 176 | End: 2025-02-14
Payer: MEDICARE

## 2025-02-14 LAB
ANION GAP SERPL CALC-SCNC: 7 MMOL/L (ref 2–12)
APPEARANCE UR: CLEAR
BACTERIA URNS QL MICRO: NEGATIVE /HPF
BILIRUB UR QL: NEGATIVE
BUN SERPL-MCNC: 24 MG/DL (ref 6–20)
BUN/CREAT SERPL: 21 (ref 12–20)
CALCIUM SERPL-MCNC: 8 MG/DL (ref 8.5–10.1)
CHLORIDE SERPL-SCNC: 102 MMOL/L (ref 97–108)
CO2 SERPL-SCNC: 24 MMOL/L (ref 21–32)
COLOR UR: ABNORMAL
CREAT SERPL-MCNC: 1.13 MG/DL (ref 0.7–1.3)
ECHO BSA: 2.12 M2
EKG ATRIAL RATE: 91 BPM
EKG DIAGNOSIS: NORMAL
EKG P AXIS: 64 DEGREES
EKG P-R INTERVAL: 156 MS
EKG Q-T INTERVAL: 346 MS
EKG QRS DURATION: 82 MS
EKG QTC CALCULATION (BAZETT): 425 MS
EKG R AXIS: -17 DEGREES
EKG T AXIS: 54 DEGREES
EKG VENTRICULAR RATE: 91 BPM
EPITH CASTS URNS QL MICRO: ABNORMAL /LPF
ERYTHROCYTE [DISTWIDTH] IN BLOOD BY AUTOMATED COUNT: 11.8 % (ref 11.5–14.5)
GLUCOSE SERPL-MCNC: 224 MG/DL (ref 65–100)
GLUCOSE UR STRIP.AUTO-MCNC: 500 MG/DL
HCT VFR BLD AUTO: 44.6 % (ref 36.6–50.3)
HGB BLD-MCNC: 15.1 G/DL (ref 12.1–17)
HGB UR QL STRIP: NEGATIVE
HYALINE CASTS URNS QL MICRO: ABNORMAL /LPF (ref 0–2)
KETONES UR QL STRIP.AUTO: ABNORMAL MG/DL
LEUKOCYTE ESTERASE UR QL STRIP.AUTO: NEGATIVE
MAGNESIUM SERPL-MCNC: 2.3 MG/DL (ref 1.6–2.4)
MCH RBC QN AUTO: 31.4 PG (ref 26–34)
MCHC RBC AUTO-ENTMCNC: 33.9 G/DL (ref 30–36.5)
MCV RBC AUTO: 92.7 FL (ref 80–99)
NITRITE UR QL STRIP.AUTO: NEGATIVE
NRBC # BLD: 0 K/UL (ref 0–0.01)
NRBC BLD-RTO: 0 PER 100 WBC
PH UR STRIP: 6 (ref 5–8)
PLATELET # BLD AUTO: 181 K/UL (ref 150–400)
PMV BLD AUTO: 9.4 FL (ref 8.9–12.9)
POTASSIUM SERPL-SCNC: 4.5 MMOL/L (ref 3.5–5.1)
PROT UR STRIP-MCNC: ABNORMAL MG/DL
RBC # BLD AUTO: 4.81 M/UL (ref 4.1–5.7)
RBC #/AREA URNS HPF: ABNORMAL /HPF (ref 0–5)
SODIUM SERPL-SCNC: 133 MMOL/L (ref 136–145)
SP GR UR REFRACTOMETRY: 1.01 (ref 1–1.03)
T4 FREE SERPL-MCNC: 1.2 NG/DL (ref 0.8–1.5)
TROPONIN I SERPL HS-MCNC: 8 NG/L (ref 0–76)
TSH SERPL DL<=0.05 MIU/L-ACNC: 2.06 UIU/ML (ref 0.36–3.74)
URINE CULTURE IF INDICATED: ABNORMAL
UROBILINOGEN UR QL STRIP.AUTO: 0.2 EU/DL (ref 0.2–1)
WBC # BLD AUTO: 6.2 K/UL (ref 4.1–11.1)
WBC URNS QL MICRO: ABNORMAL /HPF (ref 0–4)

## 2025-02-14 PROCEDURE — 84443 ASSAY THYROID STIM HORMONE: CPT

## 2025-02-14 PROCEDURE — 36415 COLL VENOUS BLD VENIPUNCTURE: CPT

## 2025-02-14 PROCEDURE — 6360000002 HC RX W HCPCS: Performed by: INTERNAL MEDICINE

## 2025-02-14 PROCEDURE — 85027 COMPLETE CBC AUTOMATED: CPT

## 2025-02-14 PROCEDURE — 93970 EXTREMITY STUDY: CPT

## 2025-02-14 PROCEDURE — 83735 ASSAY OF MAGNESIUM: CPT

## 2025-02-14 PROCEDURE — 81001 URINALYSIS AUTO W/SCOPE: CPT

## 2025-02-14 PROCEDURE — 2580000003 HC RX 258: Performed by: INTERNAL MEDICINE

## 2025-02-14 PROCEDURE — 2500000003 HC RX 250 WO HCPCS: Performed by: INTERNAL MEDICINE

## 2025-02-14 PROCEDURE — 84439 ASSAY OF FREE THYROXINE: CPT

## 2025-02-14 PROCEDURE — 80048 BASIC METABOLIC PNL TOTAL CA: CPT

## 2025-02-14 PROCEDURE — 6370000000 HC RX 637 (ALT 250 FOR IP): Performed by: INTERNAL MEDICINE

## 2025-02-14 PROCEDURE — 99222 1ST HOSP IP/OBS MODERATE 55: CPT | Performed by: SURGERY

## 2025-02-14 PROCEDURE — 1100000003 HC PRIVATE W/ TELEMETRY

## 2025-02-14 PROCEDURE — 84484 ASSAY OF TROPONIN QUANT: CPT

## 2025-02-14 RX ADMIN — ENOXAPARIN SODIUM 90 MG: 100 INJECTION SUBCUTANEOUS at 09:36

## 2025-02-14 RX ADMIN — SODIUM CHLORIDE: 9 INJECTION, SOLUTION INTRAVENOUS at 23:13

## 2025-02-14 RX ADMIN — ATORVASTATIN CALCIUM 10 MG: 10 TABLET, FILM COATED ORAL at 20:40

## 2025-02-14 RX ADMIN — SODIUM CHLORIDE, PRESERVATIVE FREE 10 ML: 5 INJECTION INTRAVENOUS at 09:39

## 2025-02-14 RX ADMIN — ENOXAPARIN SODIUM 90 MG: 100 INJECTION SUBCUTANEOUS at 20:40

## 2025-02-14 RX ADMIN — ASPIRIN 81 MG: 81 TABLET, CHEWABLE ORAL at 09:38

## 2025-02-14 RX ADMIN — SODIUM CHLORIDE: 9 INJECTION, SOLUTION INTRAVENOUS at 06:06

## 2025-02-14 ASSESSMENT — PAIN SCALES - GENERAL
PAINLEVEL_OUTOF10: 0
PAINLEVEL_OUTOF10: 0

## 2025-02-14 NOTE — CONSULTS
Consult Note            Date:2/14/2025        Patient Name:Buck Perdomo     YOB: 1950     Age:74 y.o.    Inpatient consult to General Surgery  Consult performed by: Mitchel Jack MD  Consult ordered by: Jose Miguel Solorio MD  Reason for consult: rash        Urticaria. Fading.     Discussed findings with patient and examined with GRACIA Barnes    Since rash is fading all in agreement to hold off of biopsy for now.    Please re consult if clinical picture changes.      History Obtained From   patient, electronic medical record    History of Present Illness   73 yo developed swelling and rash around lips that radiated to under the arms and then down to the legs about 2 days ago. Unknown cause. Vasculitis raised on the differential and asked to consider a skin biopsy.    +PE    This morning, rash is fading. No pan.         Past Medical History   History reviewed. No pertinent past medical history.     Past Surgical History   History reviewed. No pertinent surgical history.     Medications     Prior to Admission medications    Medication Sig Start Date End Date Taking? Authorizing Provider   lisinopril (PRINIVIL;ZESTRIL) 2.5 MG tablet Take by mouth daily   Yes ProviderHoward MD   simvastatin (ZOCOR) 5 MG tablet Take by mouth nightly   Yes Provider, MD Howard   aspirin 81 MG chewable tablet Take 1 tablet by mouth daily   Yes ProviderHoward MD   diphenhydrAMINE (BENADRYL ALLERGY) 25 MG tablet Take 1 tablet by mouth every 6 hours as needed for Itching or Allergies 2/13/25 2/23/25 Yes Yemi Cortés MD        acetaminophen (TYLENOL) tablet 650 mg, Q4H PRN  sodium chloride flush 0.9 % injection 5-40 mL, 2 times per day  sodium chloride flush 0.9 % injection 5-40 mL, PRN  0.9 % sodium chloride infusion, PRN  potassium chloride (KLOR-CON M) extended release tablet 40 mEq, PRN   Or  potassium bicarb-citric acid (EFFER-K) effervescent tablet 40 mEq, PRN   Or  potassium chloride 10 mEq/100 mL

## 2025-02-14 NOTE — CONSULTS
[facility]  [address] [facility] [address] [address]  W: [phone] F: [phone]  Hematology/ Oncology Consult Note      Reason for consult:     Buck Perdomo is a 74 y.o. male who we have been asked to see by Dr. Solorio for acute pulmonary embolism.    Subjective:     Buck Perdomo is a 74-year-old male patient admitted to [facility] with CT findings consistent with acute PE.  Patient is also evaluated for persistent Yutiq area for the past 3 to 5 days, patient was seen by general surgery and they are not going to pursue a biopsy at this time as Yutiq area is resolving.  Past medical history unremarkable except for TIA.     Patient seen at the bedside.  Reports he is fairly active however the recent winter weather has kept him indoors.  Reports he has a stationary bike and usually cycles 10 miles a day.  Reports last week due to the weather he cycled 100 miles in total.  He denies any recent travel, he is not sedentary, he is not obese and does not smoke tobacco.  He has no previous past medical history of VTE and does not know of any family members either.  He denies any palpitations or known arrhythmias.  Discussed would recommend outpatient cardiology evaluation and Holter monitor to rule out role of atrial fibrillation given acute PE without other risk factors.  Also discussed outpatient follow-up.  Patient verbalized understanding and agreement.    Discussed the risks vs benefits of initiation of anticoagulation. In general, the risk of life-threatening and fatal bleeding is lower with direct oral anticoagulants  than with vitamin K antagonists, including warfarin. Factors that increase the risk for bleeding include(but not limited to) advanced age, history of prior bleeding (such as GI, post surgical or ICH), kidney and renal disease, diabetes, cancer, drug abuse, endocarditis, frequent falls and

## 2025-02-15 LAB
CRP SERPL-MCNC: 5.38 MG/DL (ref 0–0.3)
EKG ATRIAL RATE: 72 BPM
EKG DIAGNOSIS: NORMAL
EKG P AXIS: 22 DEGREES
EKG P-R INTERVAL: 180 MS
EKG Q-T INTERVAL: 380 MS
EKG QRS DURATION: 80 MS
EKG QTC CALCULATION (BAZETT): 416 MS
EKG R AXIS: -3 DEGREES
EKG T AXIS: 8 DEGREES
EKG VENTRICULAR RATE: 72 BPM
EST. AVERAGE GLUCOSE BLD GHB EST-MCNC: 137 MG/DL
HBA1C MFR BLD: 6.4 % (ref 4–5.6)

## 2025-02-15 PROCEDURE — 2580000003 HC RX 258: Performed by: INTERNAL MEDICINE

## 2025-02-15 PROCEDURE — 86140 C-REACTIVE PROTEIN: CPT

## 2025-02-15 PROCEDURE — 83036 HEMOGLOBIN GLYCOSYLATED A1C: CPT

## 2025-02-15 PROCEDURE — 36415 COLL VENOUS BLD VENIPUNCTURE: CPT

## 2025-02-15 PROCEDURE — 2500000003 HC RX 250 WO HCPCS: Performed by: INTERNAL MEDICINE

## 2025-02-15 PROCEDURE — 1100000003 HC PRIVATE W/ TELEMETRY

## 2025-02-15 PROCEDURE — 6370000000 HC RX 637 (ALT 250 FOR IP): Performed by: INTERNAL MEDICINE

## 2025-02-15 PROCEDURE — 6360000002 HC RX W HCPCS: Performed by: INTERNAL MEDICINE

## 2025-02-15 RX ADMIN — ENOXAPARIN SODIUM 90 MG: 100 INJECTION SUBCUTANEOUS at 20:42

## 2025-02-15 RX ADMIN — ENOXAPARIN SODIUM 90 MG: 100 INJECTION SUBCUTANEOUS at 08:35

## 2025-02-15 RX ADMIN — ATORVASTATIN CALCIUM 10 MG: 10 TABLET, FILM COATED ORAL at 20:42

## 2025-02-15 RX ADMIN — ASPIRIN 81 MG: 81 TABLET, CHEWABLE ORAL at 08:34

## 2025-02-15 RX ADMIN — SODIUM CHLORIDE, PRESERVATIVE FREE 10 ML: 5 INJECTION INTRAVENOUS at 08:38

## 2025-02-15 RX ADMIN — SODIUM CHLORIDE: 9 INJECTION, SOLUTION INTRAVENOUS at 20:42

## 2025-02-15 ASSESSMENT — PAIN SCALES - GENERAL
PAINLEVEL_OUTOF10: 0

## 2025-02-16 VITALS
BODY MASS INDEX: 28.85 KG/M2 | DIASTOLIC BLOOD PRESSURE: 75 MMHG | RESPIRATION RATE: 16 BRPM | HEIGHT: 70 IN | SYSTOLIC BLOOD PRESSURE: 145 MMHG | WEIGHT: 201.5 LBS | OXYGEN SATURATION: 98 % | HEART RATE: 78 BPM | TEMPERATURE: 97.2 F

## 2025-02-16 LAB
ALBUMIN SERPL-MCNC: 2.5 G/DL (ref 3.5–5)
ALBUMIN/GLOB SERPL: 0.9 (ref 1.1–2.2)
ALP SERPL-CCNC: 56 U/L (ref 45–117)
ALT SERPL-CCNC: 26 U/L (ref 12–78)
ANION GAP SERPL CALC-SCNC: 4 MMOL/L (ref 2–12)
AST SERPL-CCNC: 24 U/L (ref 15–37)
BASOPHILS # BLD: 0.01 K/UL (ref 0–0.1)
BASOPHILS NFR BLD: 0.2 % (ref 0–1)
BILIRUB SERPL-MCNC: 0.6 MG/DL (ref 0.2–1)
BUN SERPL-MCNC: 11 MG/DL (ref 6–20)
BUN/CREAT SERPL: 15 (ref 12–20)
C3 SERPL-MCNC: 106 MG/DL (ref 82–167)
C4 SERPL-MCNC: 17 MG/DL (ref 12–38)
CALCIUM SERPL-MCNC: 7.7 MG/DL (ref 8.5–10.1)
CENTROMERE B AB SER-ACNC: <0.2 AI (ref 0–0.9)
CHLORIDE SERPL-SCNC: 106 MMOL/L (ref 97–108)
CHROMATIN AB SERPL-ACNC: <0.2 AI (ref 0–0.9)
CO2 SERPL-SCNC: 26 MMOL/L (ref 21–32)
CREAT SERPL-MCNC: 0.72 MG/DL (ref 0.7–1.3)
DIFFERENTIAL METHOD BLD: ABNORMAL
DSDNA AB SER-ACNC: <1 IU/ML (ref 0–9)
ENA JO1 AB SER-ACNC: <0.2 AI (ref 0–0.9)
ENA RNP AB SER-ACNC: <0.2 AI (ref 0–0.9)
ENA SCL70 AB SER-ACNC: <0.2 AI (ref 0–0.9)
ENA SM AB SER-ACNC: <0.2 AI (ref 0–0.9)
ENA SM+RNP AB SER-ACNC: <0.2 AI (ref 0–0.9)
ENA SS-A AB SER-ACNC: <0.2 AI (ref 0–0.9)
ENA SS-B AB SER-ACNC: 0.4 AI (ref 0–0.9)
EOSINOPHIL # BLD: 0.2 K/UL (ref 0–0.4)
EOSINOPHIL NFR BLD: 4.1 % (ref 0–7)
ERYTHROCYTE [DISTWIDTH] IN BLOOD BY AUTOMATED COUNT: 11.6 % (ref 11.5–14.5)
EST. AVERAGE GLUCOSE BLD GHB EST-MCNC: 137 MG/DL
GLOBULIN SER CALC-MCNC: 2.9 G/DL (ref 2–4)
GLUCOSE SERPL-MCNC: 135 MG/DL (ref 65–100)
HBA1C MFR BLD: 6.4 % (ref 4–5.6)
HCT VFR BLD AUTO: 36.7 % (ref 36.6–50.3)
HGB BLD-MCNC: 12.5 G/DL (ref 12.1–17)
IMM GRANULOCYTES # BLD AUTO: 0.02 K/UL (ref 0–0.04)
IMM GRANULOCYTES NFR BLD AUTO: 0.4 % (ref 0–0.5)
LYMPHOCYTES # BLD: 1.1 K/UL (ref 0.8–3.5)
LYMPHOCYTES NFR BLD: 22.4 % (ref 12–49)
MCH RBC QN AUTO: 31.5 PG (ref 26–34)
MCHC RBC AUTO-ENTMCNC: 34.1 G/DL (ref 30–36.5)
MCV RBC AUTO: 92.4 FL (ref 80–99)
MONOCYTES # BLD: 0.52 K/UL (ref 0–1)
MONOCYTES NFR BLD: 10.6 % (ref 5–13)
NEUTS SEG # BLD: 3.06 K/UL (ref 1.8–8)
NEUTS SEG NFR BLD: 62.3 % (ref 32–75)
NRBC # BLD: 0 K/UL (ref 0–0.01)
NRBC BLD-RTO: 0 PER 100 WBC
PLATELET # BLD AUTO: 155 K/UL (ref 150–400)
PMV BLD AUTO: 8.9 FL (ref 8.9–12.9)
POTASSIUM SERPL-SCNC: 4 MMOL/L (ref 3.5–5.1)
PROT SERPL-MCNC: 5.4 G/DL (ref 6.4–8.2)
RBC # BLD AUTO: 3.97 M/UL (ref 4.1–5.7)
RIBOSOMAL P AB SER-ACNC: <0.2 AI (ref 0–0.9)
SEE BELOW:, 164879: NORMAL
SODIUM SERPL-SCNC: 136 MMOL/L (ref 136–145)
WBC # BLD AUTO: 4.9 K/UL (ref 4.1–11.1)

## 2025-02-16 PROCEDURE — 2500000003 HC RX 250 WO HCPCS: Performed by: INTERNAL MEDICINE

## 2025-02-16 PROCEDURE — 80053 COMPREHEN METABOLIC PANEL: CPT

## 2025-02-16 PROCEDURE — 6360000002 HC RX W HCPCS: Performed by: INTERNAL MEDICINE

## 2025-02-16 PROCEDURE — 6370000000 HC RX 637 (ALT 250 FOR IP): Performed by: INTERNAL MEDICINE

## 2025-02-16 PROCEDURE — 85025 COMPLETE CBC W/AUTO DIFF WBC: CPT

## 2025-02-16 PROCEDURE — 36415 COLL VENOUS BLD VENIPUNCTURE: CPT

## 2025-02-16 PROCEDURE — 83036 HEMOGLOBIN GLYCOSYLATED A1C: CPT

## 2025-02-16 RX ADMIN — SODIUM CHLORIDE, PRESERVATIVE FREE 10 ML: 5 INJECTION INTRAVENOUS at 08:35

## 2025-02-16 RX ADMIN — ASPIRIN 81 MG: 81 TABLET, CHEWABLE ORAL at 08:29

## 2025-02-16 RX ADMIN — ENOXAPARIN SODIUM 90 MG: 100 INJECTION SUBCUTANEOUS at 08:29

## 2025-02-16 ASSESSMENT — PAIN SCALES - GENERAL
PAINLEVEL_OUTOF10: 0
PAINLEVEL_OUTOF10: 0

## 2025-02-16 NOTE — DISCHARGE SUMMARY
Physician Discharge Summary     Pt Name  Buck Perdomo   Admit date:  2/13/2025   Discharge date and time:      Room Number  3410/01    Medical Record Number  [id number] @ [facility]   Age  74 y.o.   Date of Birth 1950   PCP Moreno Brennan MD     Admission Diagnoses:                        Acute pulmonary embolism without acute cor pulmonale, unspecified pulmonary embolism type (HCC)   Present on Admission:   Acute pulmonary embolism without acute cor pulmonale, unspecified pulmonary embolism type (HCC)       No Known Allergies     Excerpt from HPI :   Buck Perdomo is a 74 y.o.  male with PMHx significant for TIA, carotid artery stenosis, with possible carotid artery endarterectomy, who presented to ED with a chief complaint of indigestion, rash which has been going on for past 6 days, he reports that he has been having indigestion, chest pain for few days, but he started noticing rash in axillary area and around thigh initially now it has spread to his legs his torso and extremity.  He denies taking any new medication.  No recent infection.  In the ED he was evaluated, he was cannot get discharge, but when he tried to get up, he became bradycardic to 30s and had syncopal episode while lying in the bed.  Which resolved by itself.  During the episode he had a further workup including CTA chest which revealed pulmonary embolism  We were asked to admit for work up and evaluation of the above problems.      Hospital Course: This pt was admitted with  Acute pulmonary embolism without acute cor pulmonale, unspecified pulmonary embolism type (HCC) on 2/13/2025.    Present on Admission:   Acute pulmonary embolism without acute cor pulmonale, unspecified pulmonary embolism type (HCC)       Pulmonary embolism  Syncope   Bradycardia     Chest CTA showed acute pulmonary embolus in the lingula.  This almost completely occludes the lingular pulmonary artery.  However there is no complete

## 2025-02-16 NOTE — PROGRESS NOTES
Hospitalist Progress Note        Demographics    Patient Name  Buck Perdomo   Date of Birth 1950   Medical Record Number  [id number]      Age  74 y.o.   PCP Moreno Brennan MD   Admit date:  2/13/2025  9:10 AM     Room Number  3410/01  @ [facility]           Admission Diagnoses:  Acute pulmonary embolism without acute cor pulmonale, unspecified pulmonary embolism type (HCC)   Admission Summary:  Buck Perdomo is a 74 y.o.  male with PMHx significant for TIA, carotid artery stenosis, with possible carotid artery endarterectomy, who presented to ED with a chief complaint of indigestion, rash which has been going on for past 6 days, he reports that he has been having indigestion, chest pain for few days, but he started noticing rash in axillary area and around thigh initially now it has spread to his legs his torso and extremity.  He denies taking any new medication.  No recent infection.  In the ED he was evaluated, he was cannot get discharge, but when he tried to get up, he became bradycardic to 30s and had syncopal episode while lying in the bed.  Which resolved by itself.  During the episode he had a further workup including CTA chest which revealed pulmonary embolism  We were asked to admit for work up and evaluation of the above problems.      Assessment and plan:    Pulmonary embolism  Syncope   Bradycardia    Chest CTA showed acute pulmonary embolus in the lingula.  This almost completely occludes the lingular pulmonary artery.  However there is no complete occlusion.  There is a minimal pulmonary embolus extending from this large embolus into the anterior aspect of the proximal left lower lobe pulmonary artery.  No evidence of right heart strain.    CPR POA 8.80  Electrolytes unremarkable on admission  Troponin  X 3 normal(13, 11, 8).  Lactic acid 1.90  Given Lovenox 90 mg subcu 1 dose in the ED  Continue Lovenox 90 mg subcu every 12 hours  Echo 2/13/2025 with impression 
End of Shift Note    Bedside shift change report given to AGATA Barroso (oncoming nurse) by CARLA FELICIANO RN (offgoing nurse).  Report included the following information SBAR, Kardex, and MAR    Shift worked:  7am-7pm     Shift summary and any significant changes:     Pt tolerated care fairly well. Medications were given and education was provided. Hourly rounding completed. Pt made no complaints of pain during this shift. Pt up ad phillip to the bathroom. This RN attempted to call cardio consult and was on hold for 30 minutes with no reply; REKHA Barnes notified.       Concerns for physician to address:  N/A     Zone phone for oncoming shift:   [phone]       Activity:  Level of Assistance: Independent  Number times ambulated in hallways past shift: 0  Number of times OOB to chair past shift: 1    Cardiac:   Cardiac Monitoring: Yes      Cardiac Rhythm: Sinus rhythm    Access:  Current line(s): PIV     Genitourinary:        Respiratory:   O2 Device: None (Room air)  Chronic home O2 use?: NO  Incentive spirometer at bedside: N/A    GI:     Current diet:  ADULT DIET; Regular  Passing flatus: YES    Pain Management:   Patient states pain is manageable on current regimen: N/A    Skin:  Jarek Scale Score: 22  Interventions: Wound Offloading (Prevention Methods): Bed, pressure reduction mattress, Elevate heels, Pillows, Repositioning, Turning    Patient Safety:  Fall Risk: Nursing Judgement-Fall Risk High(Add Comments): No  Fall Risk Interventions  Nursing Judgement-Fall Risk High(Add Comments): No  Toilet Every 2 Hours-In Advance of Need: Yes  Hourly Visual Checks: Awake, In bed  Fall Visual Posted: Fall sign posted, Socks  Room Door Open: Yes  Alarm On: Bed  Patient Moved Closer to Nursing Station: No    Active Consults:   IP CONSULT TO HEMATOLOGY  IP CONSULT TO GENERAL SURGERY  IP CONSULT TO CARDIOLOGY    Length of Stay:  Expected LOS: 4  Actual LOS: 2    CARLA FELICIANO RN                            
End of Shift Note    Bedside shift change report given to AGATA Medrano (oncoming nurse) by Сергей Carrillo RN (offgoing nurse).  Report included the following information SBAR, Kardex, MAR, and Recent Results    Shift worked:  7p-7a     Shift summary and any significant changes:     Patient tolerated care. Scheduled medications given per MAR. Pt was having indigestion so I messaged the provider for something and Pepcid worked better than crackers and ginger ale. Labs drawn. Caring rounds. Pt able to rest over shift.     Concerns for physician to address:  none     Zone phone for oncoming shift:          Activity:  Level of Assistance: Independent  Number times ambulated in hallways past shift: 0  Number of times OOB to chair past shift: 3    Cardiac:   Cardiac Monitoring: Yes      Cardiac Rhythm: Sinus rhythm    Access:  Current line(s): PIV     Genitourinary:        Respiratory:   O2 Device: None (Room air)  Chronic home O2 use?: NO  Incentive spirometer at bedside: NO    GI:     Current diet:  ADULT DIET; Regular  Passing flatus: YES    Pain Management:   Patient states pain is manageable on current regimen: YES    Skin:  Jarek Scale Score: 21  Interventions: Wound Offloading (Prevention Methods): Repositioning    Patient Safety:  Fall Risk:    Fall Risk Interventions  Toilet Every 2 Hours-In Advance of Need: No (Comment)  Hourly Visual Checks: In bed, Awake  Fall Visual Posted: Fall sign posted, Socks  Room Door Open: Deferred to promote rest  Alarm On: Bed  Patient Moved Closer to Nursing Station: No    Active Consults:   IP CONSULT TO HOSPITALIST  IP CONSULT TO HEMATOLOGY  IP CONSULT TO GENERAL SURGERY    Length of Stay:  Expected LOS: 2  Actual LOS: 1    Сергей Carrillo RN                            
End of Shift Note    Bedside shift change report given to Gen PARMAR (oncoming nurse) by PAVAN VALLADARES, RN (offgoing nurse).  Report included the following information SBAR and MAR    Shift worked:  7am - 7pm     Shift summary and any significant changes:     Medication given per MAR and education complete. Pt off floor today for duplex, lower legs bilat. Pt voices no complaints, SOB, Pain or any other difficulties at this time. Safety and caring rounds complete.      Concerns for physician to address:       Zone phone for oncoming shift:          Activity:  Level of Assistance: Independent  Number times ambulated in hallways past shift: 0  Number of times OOB to chair past shift: 0    Cardiac:   Cardiac Monitoring: Yes      Cardiac Rhythm: Sinus rhythm    Access:  Current line(s): PIV     Genitourinary:        Respiratory:   O2 Device: None (Room air)  Chronic home O2 use?: N/A  Incentive spirometer at bedside: N/A    GI:     Current diet:  ADULT DIET; Regular  Passing flatus: YES    Pain Management:   Patient states pain is manageable on current regimen: N/A    Skin:  Jarek Scale Score: 22  Interventions: Wound Offloading (Prevention Methods): Bed, pressure reduction mattress    Patient Safety:  Fall Risk: Nursing Judgement-Fall Risk High(Add Comments): No  Fall Risk Interventions  Nursing Judgement-Fall Risk High(Add Comments): No  Toilet Every 2 Hours-In Advance of Need: No (Comment)  Hourly Visual Checks: In bed, Awake  Fall Visual Posted: Fall sign posted, Socks  Room Door Open: Deferred to promote rest  Alarm On: Bed  Patient Moved Closer to Nursing Station: No    Active Consults:   IP CONSULT TO HEMATOLOGY  IP CONSULT TO GENERAL SURGERY  IP CONSULT TO CARDIOLOGY    Length of Stay:  Expected LOS: 4  Actual LOS: 1    PAVAN VALLADARES, RN                            
I have reviewed discharge instructions with the patient. The patient verbalized understanding. Discharge medications reviewed with patient and appropriate educational materials and side effects teaching were provided. Follow-up appointments reviewed. Opportunity for questions and clarification was provided.  Venous access removed without difficulty.  Patient's belongings gathered and sent with patient. Patient is ready for discharge.     Pt discharged home via friend for transport.     CARLA FELICIANO RN    
Nurse Rushing contacted Nocturnist/cross cover provider via non-urgent messaging system Seer and notified patient reflux, tried cracker and gingerale, asking for meds, no chest pain. No other concerns reported. No acute distress reported. No other information provided by nurse. VSS. Patient denies any further complaints or concerns. See prior hospitalist group notes for complete details of course of treatment. Recent lab work and documented vs reviewed.    Ordered pepcid 20mg po bid prn. Appreciate nurse Rushing assistance in the care of this patient.    Continue remaining plan/orders as per dayshift team. Will defer further evaluation/management and timing of discontinuation of regimens to the day shift primary attending care team. Nursing to notify dayshift Hospitalist team in the AM of overnight events and for further/continued concerns. Will remain available overnight cross coverage for further concerns if nursing/patient needs. Please note, there are RRT systems in this hospital in place that if nursing has acute or critical patient condition change or concern, this is to help facilitate and notify that patient needs immediate bedside evaluation by a provider.    Non-billable note.       
symmetrical   Cranial nerves are intact,   motor movement b/l symmetrical,   Sensory evaluation b/l symmetrical    Psych:  Alert and oriented,   normal mood & affect        Medications reviewed   Scheduled Meds:   sodium chloride flush  5-40 mL IntraVENous 2 times per day    enoxaparin  1 mg/kg SubCUTAneous Q12H    aspirin  81 mg Oral Daily    atorvastatin  10 mg Oral Nightly     Continuous Infusions:   sodium chloride      sodium chloride 75 mL/hr at 02/14/25 2313     PRN Meds:.acetaminophen, sodium chloride flush, sodium chloride, potassium chloride **OR** potassium alternative oral replacement **OR** potassium chloride, magnesium sulfate, ondansetron **OR** ondansetron, polyethylene glycol, acetaminophen **OR** acetaminophen, diphenhydrAMINE, famotidine       Relevant other information:   Results       ** No results found for the last 336 hours. **            Recent Labs     02/13/25  0954 02/14/25  0312   WBC 7.1 6.2   HGB 15.8 15.1   HCT 46.0 44.6    181     Recent Labs     02/13/25  0954 02/13/25  1355 02/14/25  0312   *  --  133*   K 4.6  --  4.5   CL 99  --  102   CO2 28  --  24   BUN 26*  --  24*   MG  --   --  2.3   ALT 16  --   --    INR  --  1.1  --       Lab Results   Component Value Date/Time    TSH 2.06 02/14/2025 05:01 PM         Other medical conditions are listed in the active hospital problem list section; these and other pertinent data were taken into consideration when the treatment plan was developed and customized to this patient's unique overall circumstances and needs.  We have reviewed relevant medical records within the constraints of this admission process.   High complexity decision making was performed for this patient who is at high risk for decompensation with multiple organ involvement.   Today total floor/unit time was 40 minutes while caring for this patient and greater than 50% of that time was spent with patient (and/or family/responsible party) coordinating

## 2025-02-16 NOTE — CARE COORDINATION
Care Management Initial Assessment       RUR: 8%  Readmission? No  1st IM letter given? Yes - 2nd IM given  1st  letter given: No    Chart reviewed. Spoke with Dr Josue and patient discharging today. Will go home with Eliquis.    CM met with patient. Informed that he lives alone but has been managing well. His primary support is his son Andrea Perdomo. He was given the Eliquis coupon. No discharge concerns verbalized. A friend is coming to the hospital to transport home.    Okay to dc from CM standpoint.      02/16/25 2170   Service Assessment   Patient Orientation Alert and Oriented   Cognition Alert   History Provided By Patient   Primary Caregiver Self   Support Systems Family Members   Patient's Healthcare Decision Maker is: Patient Declined (Legal Next of Kin Remains as Decision Maker)   PCP Verified by CM Yes   Prior Functional Level Independent in ADLs/IADLs   Current Functional Level Independent in ADLs/IADLs   Can patient return to prior living arrangement Yes   Ability to make needs known: Good   Family able to assist with home care needs: Yes   Would you like for me to discuss the discharge plan with any other family members/significant others, and if so, who? No   Financial Resources Medicare   Community Resources None   Social/Functional History   Lives With Alone   Type of Home House   Home Equipment None   Active  Yes   Discharge Planning   Type of Residence House   Living Arrangements Alone   Current Services Prior To Admission None   DME Ordered? No   Potential Assistance Purchasing Medications Yes  (given Eliquis card for 30 day free medicine)   Patient expects to be discharged to: House   Services At/After Discharge   Transition of Care Consult (CM Consult) Discharge Planning   Services At/After Discharge None   Shell Resource Information Provided? No   Mode of Transport at Discharge Other (see comment)  (friend will transport home)   Confirm Follow Up Transport Friends   Condition

## 2025-02-17 LAB
C-ANCA TITR SER IF: NORMAL TITER
MYELOPEROXIDASE AB SER IA-ACNC: <0.2 UNITS (ref 0–0.9)
P-ANCA ATYPICAL TITR SER IF: NORMAL TITER
P-ANCA TITR SER IF: NORMAL TITER
PROTEINASE3 AB SER IA-ACNC: <0.2 UNITS (ref 0–0.9)

## 2025-02-18 LAB — CH50 SERPL-ACNC: 47 U/ML

## 2025-03-05 NOTE — PROGRESS NOTES
Buck Perdomo is a 74 y.o. male here for new patient appt for acute PE.  Hospital follow up per Jad Wells.  Taking Eliquis.   Pt has been good since hospital visit. Says he is back 100%.  Riding bike everyday.   Medications reviewed.     1. Have you been to the ER, urgent care clinic since your last visit?  Hospitalized since your last visit? New Pt    2. Have you seen or consulted any other health care providers outside of the [facility] since your last visit?  Include any pap smears or colon screening.  New Pt

## 2025-03-07 ENCOUNTER — OFFICE VISIT (OUTPATIENT)
Age: 75
End: 2025-03-07
Payer: MEDICARE

## 2025-03-07 VITALS
BODY MASS INDEX: 28.2 KG/M2 | HEIGHT: 70 IN | OXYGEN SATURATION: 97 % | DIASTOLIC BLOOD PRESSURE: 66 MMHG | HEART RATE: 88 BPM | WEIGHT: 197 LBS | TEMPERATURE: 98.2 F | SYSTOLIC BLOOD PRESSURE: 152 MMHG

## 2025-03-07 DIAGNOSIS — I26.99 ACUTE PULMONARY EMBOLISM WITHOUT ACUTE COR PULMONALE, UNSPECIFIED PULMONARY EMBOLISM TYPE (HCC): Primary | ICD-10-CM

## 2025-03-07 PROCEDURE — 99214 OFFICE O/P EST MOD 30 MIN: CPT | Performed by: INTERNAL MEDICINE
